# Patient Record
Sex: MALE | Race: WHITE | Employment: FULL TIME | URBAN - NONMETROPOLITAN AREA
[De-identification: names, ages, dates, MRNs, and addresses within clinical notes are randomized per-mention and may not be internally consistent; named-entity substitution may affect disease eponyms.]

---

## 2019-05-17 ENCOUNTER — TELEPHONE (OUTPATIENT)
Dept: FAMILY MEDICINE CLINIC | Age: 29
End: 2019-05-17

## 2019-05-17 ENCOUNTER — HOSPITAL ENCOUNTER (OUTPATIENT)
Age: 29
Discharge: HOME | End: 2019-05-19
Payer: COMMERCIAL

## 2019-05-17 DIAGNOSIS — M54.50 CHRONIC LOW BACK PAIN WITHOUT SCIATICA, UNSPECIFIED BACK PAIN LATERALITY: Primary | ICD-10-CM

## 2019-05-17 DIAGNOSIS — G89.29 CHRONIC LOW BACK PAIN WITHOUT SCIATICA, UNSPECIFIED BACK PAIN LATERALITY: Primary | ICD-10-CM

## 2019-05-17 LAB
ALBUMIN SERPL-MCNC: 5 G/DL (ref 3.5–5.2)
ALP BLD-CCNC: 65 U/L (ref 40–129)
ALT SERPL-CCNC: 41 U/L (ref 0–40)
ANION GAP SERPL CALCULATED.3IONS-SCNC: 15 MMOL/L (ref 7–16)
AST SERPL-CCNC: 40 U/L (ref 0–39)
BILIRUB SERPL-MCNC: 1.1 MG/DL (ref 0–1.2)
BUN BLDV-MCNC: 15 MG/DL (ref 6–20)
CALCIUM SERPL-MCNC: 10.1 MG/DL (ref 8.6–10.2)
CHLORIDE BLD-SCNC: 99 MMOL/L (ref 98–107)
CHOLESTEROL, FASTING: 208 MG/DL (ref 0–199)
CO2: 26 MMOL/L (ref 22–29)
CREAT SERPL-MCNC: 1 MG/DL (ref 0.7–1.2)
GFR AFRICAN AMERICAN: >60
GFR NON-AFRICAN AMERICAN: >60 ML/MIN/1.73
GLUCOSE BLD-MCNC: 82 MG/DL (ref 74–99)
HDLC SERPL-MCNC: 38 MG/DL
LDL CHOLESTEROL CALCULATED: 113 MG/DL (ref 0–99)
POTASSIUM SERPL-SCNC: 4.3 MMOL/L (ref 3.5–5)
SODIUM BLD-SCNC: 140 MMOL/L (ref 132–146)
TOTAL PROTEIN: 7.9 G/DL (ref 6.4–8.3)
TRIGLYCERIDE, FASTING: 285 MG/DL (ref 0–149)
VLDLC SERPL CALC-MCNC: 57 MG/DL

## 2019-05-17 PROCEDURE — 80061 LIPID PANEL: CPT

## 2019-05-17 PROCEDURE — 80053 COMPREHEN METABOLIC PANEL: CPT

## 2019-05-17 PROCEDURE — 36415 COLL VENOUS BLD VENIPUNCTURE: CPT

## 2019-06-10 ENCOUNTER — OFFICE VISIT (OUTPATIENT)
Dept: RHEUMATOLOGY | Age: 29
End: 2019-06-10
Payer: COMMERCIAL

## 2019-06-10 ENCOUNTER — HOSPITAL ENCOUNTER (OUTPATIENT)
Age: 29
Discharge: HOME | End: 2019-06-12
Payer: COMMERCIAL

## 2019-06-10 VITALS
TEMPERATURE: 98.5 F | SYSTOLIC BLOOD PRESSURE: 112 MMHG | OXYGEN SATURATION: 97 % | BODY MASS INDEX: 30.35 KG/M2 | HEART RATE: 54 BPM | DIASTOLIC BLOOD PRESSURE: 76 MMHG | HEIGHT: 67 IN | WEIGHT: 193.4 LBS

## 2019-06-10 DIAGNOSIS — G89.29 CHRONIC BILATERAL LOW BACK PAIN WITHOUT SCIATICA: ICD-10-CM

## 2019-06-10 DIAGNOSIS — M54.50 CHRONIC BILATERAL LOW BACK PAIN WITHOUT SCIATICA: ICD-10-CM

## 2019-06-10 DIAGNOSIS — M25.50 MULTIPLE JOINT PAIN: ICD-10-CM

## 2019-06-10 DIAGNOSIS — M54.50 CHRONIC BILATERAL LOW BACK PAIN WITHOUT SCIATICA: Primary | ICD-10-CM

## 2019-06-10 DIAGNOSIS — G89.29 CHRONIC BILATERAL LOW BACK PAIN WITHOUT SCIATICA: Primary | ICD-10-CM

## 2019-06-10 LAB
C-REACTIVE PROTEIN: <0.1 MG/DL (ref 0–0.4)
RHEUMATOID FACTOR: <10 IU/ML (ref 0–13)

## 2019-06-10 PROCEDURE — 86200 CCP ANTIBODY: CPT

## 2019-06-10 PROCEDURE — 87491 CHLMYD TRACH DNA AMP PROBE: CPT

## 2019-06-10 PROCEDURE — 86431 RHEUMATOID FACTOR QUANT: CPT

## 2019-06-10 PROCEDURE — 87591 N.GONORRHOEAE DNA AMP PROB: CPT

## 2019-06-10 PROCEDURE — 36415 COLL VENOUS BLD VENIPUNCTURE: CPT

## 2019-06-10 PROCEDURE — 86812 HLA TYPING A B OR C: CPT

## 2019-06-10 PROCEDURE — 99203 OFFICE O/P NEW LOW 30 MIN: CPT | Performed by: INTERNAL MEDICINE

## 2019-06-10 PROCEDURE — 86140 C-REACTIVE PROTEIN: CPT

## 2019-06-10 RX ORDER — MELOXICAM 15 MG/1
15 TABLET ORAL DAILY PRN
Qty: 30 TABLET | Refills: 2 | Status: SHIPPED | OUTPATIENT
Start: 2019-06-10

## 2019-06-10 SDOH — HEALTH STABILITY: MENTAL HEALTH: HOW OFTEN DO YOU HAVE A DRINK CONTAINING ALCOHOL?: MONTHLY OR LESS

## 2019-06-10 SDOH — HEALTH STABILITY: MENTAL HEALTH: HOW MANY STANDARD DRINKS CONTAINING ALCOHOL DO YOU HAVE ON A TYPICAL DAY?: 1 OR 2

## 2019-06-10 ASSESSMENT — ENCOUNTER SYMPTOMS
ABDOMINAL PAIN: 0
WHEEZING: 0
SORE THROAT: 0
BACK PAIN: 1
SINUS PAIN: 0
PHOTOPHOBIA: 0
EYE PAIN: 0
BLOOD IN STOOL: 0
ABDOMINAL DISTENTION: 0
COLOR CHANGE: 0
VOMITING: 0
SHORTNESS OF BREATH: 0
COUGH: 0
CONSTIPATION: 0
EYE REDNESS: 0
EYE DISCHARGE: 0
CHEST TIGHTNESS: 0
TROUBLE SWALLOWING: 0
EYE ITCHING: 0
SINUS PRESSURE: 0
DIARRHEA: 0

## 2019-06-10 ASSESSMENT — ROUTINE ASSESSMENT OF PATIENT INDEX DATA (RAPID3)
ON A SCALE OF ONE TO TEN, HOW MUCH PAIN HAVE YOU HAD BECAUSE OF YOUR CONDITION OVER THE PAST WEEK?: 7
TOTAL RAPID3 SCORE: 9
ON A SCALE OF ONE TO TEN, CONSIDERING ALL THE WAYS IN WHICH ILLNESS AND HEALTH CONDITIONS MAY AFFECT YOU AT THIS TIME, PLEASE INDICATE BELOW HOW YOU ARE DOING:: 2

## 2019-06-10 NOTE — PROGRESS NOTES
6/10/19    Name: Ele Rush  : 1990  Age: 30 y.o. Sex: male    Patient is seen at the request of JUAN A Barrios CNP    Patient presents for a rheumatology consultation regarding low back pain. Chief Complaint   Patient presents with    Arthritis     Xrays obtained per PCP showed mild arthritis       Low back pain - since past 10 years , progressively getting worse, associated with morning stiffness which lasts for few hours, pain gets worse in the evening , pain and then in the morning. Reports pain in knees and shoulders. Denies redness and swelling. Takes OTC NSAID which usually helps him for short period of time. Denies personal and family history of Psoriatics arthritis, IBD, uveitis, STD. Denies recent travel. Denies injury or trauma. 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 Latest X ray of lumbar spine indicates mild facet arthropathy at L5-S1. Vitals:    06/10/19 0803   BP: 112/76   Site: Left Upper Arm   Position: Sitting   Pulse: 54   Temp: 98.5 °F (36.9 °C)   TempSrc: Temporal   SpO2: 97%   Weight: 193 lb 6.4 oz (87.7 kg)   Height: 5' 7\" (1.702 m)        Review of Systems   Constitutional: Positive for fatigue. Negative for chills, fever and unexpected weight change. HENT: Negative for congestion, ear discharge, ear pain, hearing loss, mouth sores, nosebleeds, sinus pressure, sinus pain, sore throat and trouble swallowing. Eyes: Negative for photophobia, pain, discharge, redness and itching. Respiratory: Negative for cough, chest tightness, shortness of breath and wheezing. Cardiovascular: Negative for chest pain, palpitations and leg swelling. Gastrointestinal: Negative for abdominal distention, abdominal pain, blood in stool, constipation, diarrhea and vomiting. Endocrine: Negative for cold intolerance, heat intolerance, polydipsia, polyphagia and polyuria. Genitourinary: Negative for dysuria, flank pain, genital sores and hematuria.    Musculoskeletal: Positive for arthralgias and back pain. Negative for myalgias and neck pain. Skin: Negative for color change, pallor, rash and wound. Allergic/Immunologic: Negative for environmental allergies and food allergies. Neurological: Positive for headaches. Negative for dizziness, seizures, syncope, weakness, light-headedness and numbness. Hematological: Negative for adenopathy. Does not bruise/bleed easily. Psychiatric/Behavioral: Negative for confusion. The patient is not nervous/anxious. Current Outpatient Medications:     meloxicam (MOBIC) 15 MG tablet, Take 1 tablet by mouth daily as needed for Pain, Disp: 30 tablet, Rfl: 2  No Known Allergies    HEALTH MAINTENANCE:   Last Dexa Scan:  N/A  Last Eye Exam: within past one year   Influenza Vaccine: none   Pneumonia Vaccine: non     No past medical history on file. No family history on file. No past surgical history on file.    Social History     Socioeconomic History    Marital status: Unknown     Spouse name: Not on file    Number of children: Not on file    Years of education: Not on file    Highest education level: Not on file   Occupational History     Employer: [employer] resource strain: Not on file    Food insecurity:     Worry: Not on file     Inability: Not on file    Transportation needs:     Medical: Not on file     Non-medical: Not on file   Tobacco Use    Smoking status: Never Smoker    Smokeless tobacco: Current User     Types: Chew   Substance and Sexual Activity    Alcohol use: Yes     Frequency: Monthly or less     Drinks per session: 1 or 2     Binge frequency: Never    Drug use: Never    Sexual activity: Yes     Partners: Female   Lifestyle    Physical activity:     Days per week: Not on file     Minutes per session: Not on file    Stress: Not on file   Relationships    Social connections:     Talks on phone: Not on file     Gets together: Not on file     Attends Sikhism service: Not on *This document was created using voice recognition software. Note was reviewed, however may contain grammatical errors.

## 2019-06-13 LAB
C. TRACHOMATIS DNA ,URINE: NEGATIVE
CCP IGG ANTIBODIES: 2 UNITS (ref 0–19)
HLA B27: NEGATIVE
N. GONORRHOEAE DNA, URINE: NEGATIVE
SOURCE: NORMAL

## 2019-07-24 RX ORDER — ATORVASTATIN CALCIUM 40 MG/1
40 TABLET, FILM COATED ORAL EVERY EVENING
COMMUNITY

## 2020-02-05 ENCOUNTER — TELEPHONE (OUTPATIENT)
Dept: ADMINISTRATIVE | Age: 30
End: 2020-02-05

## 2021-09-13 ENCOUNTER — OFFICE VISIT (OUTPATIENT)
Dept: FAMILY MEDICINE CLINIC | Age: 31
End: 2021-09-13
Payer: COMMERCIAL

## 2021-09-13 VITALS
WEIGHT: 165.8 LBS | RESPIRATION RATE: 16 BRPM | HEIGHT: 67 IN | SYSTOLIC BLOOD PRESSURE: 112 MMHG | DIASTOLIC BLOOD PRESSURE: 84 MMHG | TEMPERATURE: 96.9 F | HEART RATE: 62 BPM | OXYGEN SATURATION: 95 % | BODY MASS INDEX: 26.02 KG/M2

## 2021-09-13 DIAGNOSIS — J01.40 ACUTE NON-RECURRENT PANSINUSITIS: ICD-10-CM

## 2021-09-13 DIAGNOSIS — J02.9 SORE THROAT: Primary | ICD-10-CM

## 2021-09-13 PROCEDURE — 4004F PT TOBACCO SCREEN RCVD TLK: CPT | Performed by: FAMILY MEDICINE

## 2021-09-13 PROCEDURE — G8427 DOCREV CUR MEDS BY ELIG CLIN: HCPCS | Performed by: FAMILY MEDICINE

## 2021-09-13 PROCEDURE — G8419 CALC BMI OUT NRM PARAM NOF/U: HCPCS | Performed by: FAMILY MEDICINE

## 2021-09-13 PROCEDURE — 99213 OFFICE O/P EST LOW 20 MIN: CPT | Performed by: FAMILY MEDICINE

## 2021-09-13 RX ORDER — PREDNISONE 10 MG/1
TABLET ORAL
Qty: 18 TABLET | Refills: 0 | Status: SHIPPED | OUTPATIENT
Start: 2021-09-13 | End: 2021-09-22

## 2021-09-13 RX ORDER — AMOXICILLIN 875 MG/1
875 TABLET, COATED ORAL 2 TIMES DAILY
Qty: 20 TABLET | Refills: 0 | Status: SHIPPED
Start: 2021-09-13 | End: 2021-09-13

## 2021-09-13 RX ORDER — AMOXICILLIN 875 MG/1
875 TABLET, COATED ORAL 2 TIMES DAILY
Qty: 20 TABLET | Refills: 0 | Status: SHIPPED | OUTPATIENT
Start: 2021-09-13 | End: 2021-09-23

## 2021-09-13 RX ORDER — PREDNISONE 10 MG/1
TABLET ORAL
Qty: 18 TABLET | Refills: 0 | Status: SHIPPED
Start: 2021-09-13 | End: 2021-09-13

## 2021-09-13 ASSESSMENT — ENCOUNTER SYMPTOMS
ALLERGIC/IMMUNOLOGIC NEGATIVE: 1
NAUSEA: 0
PHOTOPHOBIA: 0
COUGH: 1
EYE DISCHARGE: 0
SINUS PAIN: 1
SORE THROAT: 1
SHORTNESS OF BREATH: 0
EYE PAIN: 0
SINUS PRESSURE: 1
DIARRHEA: 0
TROUBLE SWALLOWING: 0
BACK PAIN: 0
EYE REDNESS: 0
CHEST TIGHTNESS: 0
VOMITING: 0
BLOOD IN STOOL: 0
ABDOMINAL PAIN: 0

## 2021-09-13 NOTE — LETTER
[address] [address]  Phone: [phone]  Fax: [facility],         September 13, 2021     Patient: Shanthi Galvez   YOB: 1990   Date of Visit: 9/13/2021       To Whom it May Concern:    Diann Oliva was seen in my clinic on 9/13/2021. He may return to work on 9/15/2021 pending negative Covid test results. If you have any questions or concerns, please don't hesitate to call.     Sincerely,         Isabel Obrien DO

## 2021-09-13 NOTE — PROGRESS NOTES
21  Rogelio Evans : 1990 Sex: male  Age: 28 y.o. Assessment and Plan:  Priscilla Gavin was seen today for pharyngitis, nasal congestion, other, otalgia and chills. Diagnoses and all orders for this visit:    Sore throat  -     COVID-19 Ambulatory; Future    Acute non-recurrent pansinusitis  -     amoxicillin (AMOXIL) 875 MG tablet; Take 1 tablet by mouth 2 times daily for 10 days  -     predniSONE (DELTASONE) 10 MG tablet; Take 3 tablets by mouth daily for 3 days, THEN 2 tablets daily for 3 days, THEN 1 tablet daily for 3 days. Tylenol, fluids, rest, cool mist, call, recheck here or to ER immediately if condition worsens, self isolate for the next 48 hours, PCR positive, then total of 10 days. He is to recheck here to the emergency room immediately if complaints worsen in any way. Return To 5 days with PCP if not improving. .    Chief Complaint   Patient presents with    Pharyngitis     x 1 day ( the start of it all)     Nasal Congestion     x 1 day     Other     sore face x1 day     Otalgia     x1 day left ear     Chills     x1 day        Congestion, pressure, drainage, facial tenderness, mild headache, onset 3 days ago. Is Covid exposure, loss of taste or smell. Denies fever, chills, diaphoresis, nausea, vomiting, decreased oral intake. Denies other GI or  complaints. OTC treatments minimally effective. Review of Systems   Constitutional: Negative for appetite change, fatigue and unexpected weight change. HENT: Positive for congestion, postnasal drip, sinus pressure, sinus pain and sore throat. Negative for ear pain, hearing loss and trouble swallowing. Eyes: Negative for photophobia, pain, discharge and redness. Respiratory: Positive for cough. Negative for chest tightness and shortness of breath. Cardiovascular: Negative for chest pain, palpitations and leg swelling. Gastrointestinal: Negative for abdominal pain, blood in stool, diarrhea, nausea and vomiting. Endocrine: Negative. Genitourinary: Negative for dysuria, flank pain, frequency and hematuria. Musculoskeletal: Negative for arthralgias, back pain, joint swelling and myalgias. Skin: Negative. Allergic/Immunologic: Negative. Neurological: Negative for dizziness, seizures, syncope, weakness, light-headedness, numbness and headaches. Hematological: Negative for adenopathy. Does not bruise/bleed easily. Psychiatric/Behavioral: Negative. Current Outpatient Medications:     amoxicillin (AMOXIL) 875 MG tablet, Take 1 tablet by mouth 2 times daily for 10 days, Disp: 20 tablet, Rfl: 0    predniSONE (DELTASONE) 10 MG tablet, Take 3 tablets by mouth daily for 3 days, THEN 2 tablets daily for 3 days, THEN 1 tablet daily for 3 days. , Disp: 18 tablet, Rfl: 0    atorvastatin (LIPITOR) 40 MG tablet, Take 40 mg by mouth every evening (Patient not taking: Reported on 9/13/2021), Disp: , Rfl:     sertraline (ZOLOFT) 50 MG tablet, Take 50 mg by mouth 1/pill daily for 1wk then 1 by mouth every day (Patient not taking: Reported on 9/13/2021), Disp: , Rfl:     meloxicam (MOBIC) 15 MG tablet, Take 1 tablet by mouth daily as needed for Pain (Patient not taking: Reported on 9/13/2021), Disp: 30 tablet, Rfl: 2  No Known Allergies    Past Medical History:   Diagnosis Date    Anxiety     Chronic constipation      Past Surgical History:   Procedure Laterality Date    FRACTURE SURGERY      rt 5th metatarasal    TONSILLECTOMY AND ADENOIDECTOMY      WISDOM TOOTH EXTRACTION       No family history on file.   Social History     Socioeconomic History    Marital status: Unknown     Spouse name: Not on file    Number of children: Not on file    Years of education: Not on file    Highest education level: Not on file   Occupational History    Not on file   Tobacco Use    Smoking status: Never Smoker    Smokeless tobacco: Current User     Types: Chew   Vaping Use    Vaping Use: Never used   Substance and Sexual Activity    Alcohol use: Yes    Drug use: Never    Sexual activity: Yes     Partners: Female   Other Topics Concern    Not on file   Social History Narrative    Not on file     Social Determinants of Health     Financial Resource Strain:     Difficulty of Paying Living Expenses:    Food Insecurity:     Worried About Running Out of Food in the Last Year:     [address] in the Last Year:    Transportation Needs:     Lack of Transportation (Medical):  Lack of Transportation (Non-Medical):    Physical Activity:     Days of Exercise per Week:     Minutes of Exercise per Session:    Stress:     Feeling of Stress :    Social Connections:     Frequency of Communication with Friends and Family:     Frequency of Social Gatherings with Friends and Family:     Attends [employer] Services:     Active Member of Clubs or Organizations:     Attends Club or Organization Meetings:     Marital Status:    Intimate Partner Violence:     Fear of Current or Ex-Partner:     Emotionally Abused:     Physically Abused:     Sexually Abused:        Vitals:    09/13/21 0820   BP: 112/84   Pulse: 62   Resp: 16   Temp: 96.9 °F (36.1 °C)   TempSrc: Temporal   SpO2: 95%   Weight: 165 lb 12.8 oz (75.2 kg)   Height: 5' 7\" (1.702 m)       Physical Exam  Vitals and nursing note reviewed. Constitutional:       Appearance: He is well-developed. HENT:      Head: Atraumatic. Right Ear: External ear normal.      Left Ear: External ear normal.      Nose: Congestion present. Mouth/Throat:      Pharynx: Posterior oropharyngeal erythema present. No oropharyngeal exudate. Eyes:      Conjunctiva/sclera: Conjunctivae normal.      Pupils: Pupils are equal, round, and reactive to light. Neck:      Thyroid: No thyromegaly. Trachea: No tracheal deviation. Cardiovascular:      Rate and Rhythm: Normal rate and regular rhythm. Heart sounds: No murmur heard. No friction rub. No gallop.     Pulmonary: Effort: Pulmonary effort is normal. No respiratory distress. Breath sounds: Normal breath sounds. Abdominal:      General: Bowel sounds are normal.      Palpations: Abdomen is soft. Musculoskeletal:         General: No tenderness or deformity. Normal range of motion. Cervical back: Normal range of motion and neck supple. Lymphadenopathy:      Cervical: No cervical adenopathy. Skin:     General: Skin is warm and dry. Capillary Refill: Capillary refill takes less than 2 seconds. Findings: No rash. Neurological:      Mental Status: He is alert and oriented to person, place, and time. Sensory: No sensory deficit. Motor: No abnormal muscle tone.       Coordination: Coordination normal.      Deep Tendon Reflexes: Reflexes normal.             Seen By:  Maile Severs, DO

## 2025-08-14 SDOH — HEALTH STABILITY: PHYSICAL HEALTH: ON AVERAGE, HOW MANY DAYS PER WEEK DO YOU ENGAGE IN MODERATE TO STRENUOUS EXERCISE (LIKE A BRISK WALK)?: 2 DAYS

## 2025-08-14 SDOH — HEALTH STABILITY: PHYSICAL HEALTH: ON AVERAGE, HOW MANY MINUTES DO YOU ENGAGE IN EXERCISE AT THIS LEVEL?: 60 MIN

## 2025-08-15 ENCOUNTER — OFFICE VISIT (OUTPATIENT)
Dept: PRIMARY CARE CLINIC | Age: 35
End: 2025-08-15
Payer: COMMERCIAL

## 2025-08-15 ENCOUNTER — TELEPHONE (OUTPATIENT)
Dept: PRIMARY CARE CLINIC | Age: 35
End: 2025-08-15

## 2025-08-15 VITALS
SYSTOLIC BLOOD PRESSURE: 120 MMHG | BODY MASS INDEX: 23.7 KG/M2 | HEART RATE: 70 BPM | WEIGHT: 151 LBS | RESPIRATION RATE: 18 BRPM | DIASTOLIC BLOOD PRESSURE: 68 MMHG | TEMPERATURE: 96.9 F | OXYGEN SATURATION: 99 % | HEIGHT: 67 IN

## 2025-08-15 DIAGNOSIS — F41.9 ANXIETY: Primary | ICD-10-CM

## 2025-08-15 DIAGNOSIS — R53.82 CHRONIC FATIGUE: ICD-10-CM

## 2025-08-15 PROCEDURE — 99204 OFFICE O/P NEW MOD 45 MIN: CPT | Performed by: FAMILY MEDICINE

## 2025-08-15 PROCEDURE — G8427 DOCREV CUR MEDS BY ELIG CLIN: HCPCS | Performed by: FAMILY MEDICINE

## 2025-08-15 PROCEDURE — G8420 CALC BMI NORM PARAMETERS: HCPCS | Performed by: FAMILY MEDICINE

## 2025-08-15 PROCEDURE — 1036F TOBACCO NON-USER: CPT | Performed by: FAMILY MEDICINE

## 2025-08-15 RX ORDER — HYDROXYZINE HYDROCHLORIDE 25 MG/1
25 TABLET, FILM COATED ORAL EVERY 8 HOURS PRN
Qty: 30 TABLET | Refills: 0 | Status: SHIPPED | OUTPATIENT
Start: 2025-08-15 | End: 2025-08-25

## 2025-08-15 SDOH — ECONOMIC STABILITY: FOOD INSECURITY: WITHIN THE PAST 12 MONTHS, THE FOOD YOU BOUGHT JUST DIDN'T LAST AND YOU DIDN'T HAVE MONEY TO GET MORE.: NEVER TRUE

## 2025-08-15 SDOH — ECONOMIC STABILITY: FOOD INSECURITY: WITHIN THE PAST 12 MONTHS, YOU WORRIED THAT YOUR FOOD WOULD RUN OUT BEFORE YOU GOT MONEY TO BUY MORE.: NEVER TRUE

## 2025-08-15 ASSESSMENT — PATIENT HEALTH QUESTIONNAIRE - PHQ9
2. FEELING DOWN, DEPRESSED OR HOPELESS: NOT AT ALL
SUM OF ALL RESPONSES TO PHQ QUESTIONS 1-9: 0
SUM OF ALL RESPONSES TO PHQ QUESTIONS 1-9: 0
1. LITTLE INTEREST OR PLEASURE IN DOING THINGS: NOT AT ALL
SUM OF ALL RESPONSES TO PHQ QUESTIONS 1-9: 0
SUM OF ALL RESPONSES TO PHQ QUESTIONS 1-9: 0